# Patient Record
Sex: MALE | Race: WHITE | NOT HISPANIC OR LATINO | Employment: FULL TIME | URBAN - NONMETROPOLITAN AREA
[De-identification: names, ages, dates, MRNs, and addresses within clinical notes are randomized per-mention and may not be internally consistent; named-entity substitution may affect disease eponyms.]

---

## 2019-12-17 ENCOUNTER — OFFICE VISIT CONVERTED (OUTPATIENT)
Dept: FAMILY MEDICINE CLINIC | Age: 44
End: 2019-12-17
Attending: NURSE PRACTITIONER

## 2020-10-15 ENCOUNTER — OFFICE VISIT CONVERTED (OUTPATIENT)
Dept: FAMILY MEDICINE CLINIC | Age: 45
End: 2020-10-15
Attending: NURSE PRACTITIONER

## 2020-10-17 LAB
ALBUMIN SERPL-MCNC: 4.8 G/DL
ALBUMIN/GLOB SERPL: 2 {RATIO}
ALP SERPL-CCNC: 60 IU/L
ALT SERPL-CCNC: 27 IU/L
APPEARANCE UR: CLEAR
AST SERPL-CCNC: 26 IU/L
BASOPHILS # BLD AUTO: 0.1 X10E3/UL
BASOPHILS NFR BLD AUTO: 1 %
BILIRUB SERPL-MCNC: 0.6 MG/DL
BILIRUB SERPL-MCNC: NEGATIVE MG/DL
BUN SERPL-MCNC: 13 MG/DL
BUN/CREAT SERPL: 11
CALCIUM SERPL-MCNC: 9.5 MG/DL
CHLORIDE SERPL-SCNC: 104 MMOL/L
CHOLEST SERPL-MCNC: 134 MG/DL
CO2 SERPL-SCNC: 25 MMOL/L
COLOR UR: YELLOW
CONV BACTERIA IN URINE MICRO: ABNORMAL
CONV IMMATURE GRAN: 0 %
CONV TOTAL PROTEIN: 7.2 G/DL
CONV UROBILINOGEN IN URINE BY AUTOMATED TEST STRIP: 0.2 MG/DL
CREAT UR-MCNC: 1.19 MG/DL
CRYSTALS FLD MICRO: PRESENT
CRYSTALS URNS MICRO: ABNORMAL
EOSINOPHIL # BLD AUTO: 0.1 X10E3/UL
EOSINOPHIL # BLD AUTO: 1 %
ERYTHROCYTE [DISTWIDTH] IN BLOOD BY AUTOMATED COUNT: 12.6 %
GLOBULIN UR ELPH-MCNC: 2.4 G/DL
GLUCOSE SERPL-MCNC: 99 MG/DL
GLUCOSE SERPL-MCNC: NEGATIVE MG/DL
HCT VFR BLD AUTO: 44.7 %
HDLC SERPL-MCNC: 34 MG/DL
HEMOCCULT STL QL IA: NEGATIVE
HGB BLD-MCNC: 15.2 G/DL
IMM GRANULOCYTES # BLD: 0 X10E3/UL
KETONES UR QL STRIP: NEGATIVE
LDLC SERPL CALC-MCNC: 84 MG/DL
LEUKOCYTE ESTERASE UR QL STRIP: NEGATIVE
LYMPHOCYTES # BLD AUTO: 3 X10E3/UL
LYMPHOCYTES NFR BLD AUTO: 41 %
MAGNESIUM SERPL-MCNC: 2.2 MG/DL
MCH RBC QN AUTO: 30.6 PG
MCHC RBC AUTO-ENTMCNC: 34 G/DL
MCV RBC AUTO: 90 FL
MICRO URNS: NORMAL
MONOCYTES # BLD AUTO: 0.6 X10E3/UL
MONOCYTES NFR BLD AUTO: 8 %
MUCOUS THREADS URNS QL MICRO: PRESENT
NEUTROPHILS # BLD AUTO: 3.7 X10E3/UL
NEUTROPHILS NFR BLD AUTO: 49 %
NITRITE UR QL STRIP: NEGATIVE
PH FLD: 6 [PH]
PLATELET # BLD AUTO: 286 X10E3/UL
POTASSIUM SERPL-SCNC: 4.5 MMOL/L
PROT FLD-MCNC: NEGATIVE G/DL
RBC # BLD AUTO: 4.97 X10E6/UL
RBC # BLD AUTO: ABNORMAL /HPF
SODIUM SERPL-SCNC: 140 MMOL/L
SP GR UR: 1.02
SQUAMOUS SPT QL MICRO: ABNORMAL /HPF
TESTOST FREE SERPL-MCNC: 14.7 PG/ML
TESTOST SERPL-MCNC: 293 NG/DL
TRIGL SERPL-MCNC: 80 MG/DL
TSH SERPL-ACNC: 1.01 UIU/ML
VLDLC SERPL-MCNC: 16 MG/DL
WBC # BLD AUTO: 7.4 X10E3/UL
WBC # BLD AUTO: ABNORMAL /HPF

## 2021-05-18 NOTE — PROGRESS NOTES
Efrain Huerta  1975     Office/Outpatient Visit    Visit Date: Thu, Oct 15, 2020 08:56 am    Provider: Minor Piper N.P. (Assistant: Chanell Prater LPN)    Location: [facility]        Electronically signed by Minor Piper N.P. on  10/15/2020 10:08:20 AM                             Subjective:        CC: Mr. Huerta is a 45 year old White male.  physical exam         HPI:           Patient to be evaluated for encounter for general adult medical examination without abnormal findings.  His last physical exam was last year.  A hearing test was done results were normal.   He's had vision screening done 2 years ago and this was normal.  He performs testicular self-exams occasionally.  Depression screen is performed and is negative.  He is not current with his influenza immunization.      Smoking Status:  Nonsmoker           PHQ-9 Depression Screening: Completed form scanned and in chart; Total Score 3     ROS:     CONSTITUTIONAL:  Negative for chills, fatigue, fever, and weight change.      E/N/T:  Negative for diminished hearing and nasal congestion.      CARDIOVASCULAR:  Positive for BP mild elevation, been watching diet, down about 20 lbs , going to Gym 3-4 x week, discussed Low Na diet, less than 2gm day.      RESPIRATORY:  Negative for recent cough, dyspnea and frequent wheezing.      GASTROINTESTINAL:  Negative for abdominal pain, constipation, diarrhea, nausea and vomiting.      GENITOURINARY:  Positive for trouble keeping erection , has no trouble getting erection.   Negative for dysuria, hematuria or nocturia.      INTEGUMENTARY:  Negative for atypical moles, dry skin, pruritis, and rashes.      NEUROLOGICAL:  Positive for dizziness ( with positional changes; mainly with going from sitting to standing position ).   Negative for memory loss, paresthesias, tremor or weakness.      PSYCHIATRIC:  Positive for (no trouble falling asleep but trouble if gets up at all,  has trouble going back to sleep).   Negative for anxiety, depression or suicidal thoughts.          Past Medical History / Family History / Social History:         Last Reviewed on 10/15/2020 09:22 AM by Minor Piper    Past Medical History:             PAST MEDICAL HISTORY     UNREMARKABLE     Hospitalizations: peritonitis, admitted once on 11-25-19         Surgical History:         Tonsillectomy/Adenoidectomy     Arthroscopy: R KNEE;;; at age 16;     Right knee scope 11/ 2015;     Appendectomy: 11-24-19, ruptured appendix , was in hospital 4 days;         Family History:     Father: Hypertension;  Osteoarthritis     Mother: Heart issue     Sister(s): 1 sister alive, back problems sister(s) total         Social History:     Occupation: [employer]     Marital Status:      Children: 8 adopted children         Tobacco/Alcohol/Supplements:     Last Reviewed on 10/15/2020 09:22 AM by Minor Piper    Tobacco:  Nonsmoker (never smoked);         Substance Abuse History:     Last Reviewed on 10/15/2020 09:22 AM by Minor Piper        Mental Health History:     Last Reviewed on 10/15/2020 09:22 AM by Minor Piper        Communicable Diseases (eg STDs):     Last Reviewed on 10/15/2020 09:22 AM by Minor Piper        Current Problems:     Last Reviewed on 10/15/2020 09:22 AM by Minor Piper    Encounter for screening for depression    Encounter for general adult medical examination without abnormal findings        Immunizations:     influenza, injectable, quadrivalent, preservative free 11/1/2019    Fluvirin (4 + years dose) 10/10/2014    Fluzone (3 + years dose) 12/27/2010        Allergies:     Last Reviewed on 10/15/2020 09:22 AM by Minor Piper    No Known Allergies.        Current Medications:     Last Reviewed on 10/15/2020 09:22 AM by Minor Piper    None        Objective:        Vitals:         Current: 10/15/2020 8:58:58 AM    Ht:  6 ft, 2 in;  Wt: 265.4  lbs;  BMI: 34.1T: 97.2 F (oral);  BP: 129/87 mm Hg (left arm, sitting);  P: 76 bpm (left arm (BP Cuff), sitting);  sCr: 1.17 mg/dL;  GFR: 94.44        Repeat:     9:37:58 AM  BP:   139/92mm Hg (left arm, lying, Pulse 75) 9:39:48 AM  BP:   125/98mm Hg (left arm, sitting, Pulse 82) 9:41:35 AM  BP:   138/100mm Hg (left arm, standing, Pulse 88)     Exams:     PHYSICAL EXAM:     GENERAL: Vitals recorded well developed, well nourished;  well groomed;  no apparent distress;     E/N/T:  normal EACs, TMs, nasal/oral mucosa, teeth, gingiva, and oropharynx;     NECK: range of motion is normal; thyroid is non-palpable; carotid exam is normal with good upstroke and no bruits;     RESPIRATORY: normal respiratory rate and pattern with no distress; normal breath sounds with no rales, rhonchi, wheezes or rubs;     CARDIOVASCULAR: normal rate; rhythm is regular;  no systolic murmur; no edema;     GASTROINTESTINAL: nontender, nondistended; no hepatosplenomegaly or masses; no bruits;     MUSCULOSKELETAL:  Normal range of motion, strength and tone;     NEUROLOGIC: GROSSLY INTACT     PSYCHIATRIC:  appropriate affect and demeanor; normal speech pattern; grossly normal memory;         Lab/Test Results:         LABORATORY RESULTS: EKG performed by bb         Assessment:         Z00.00   Encounter for general adult medical examination without abnormal findings       Z13.31   Encounter for screening for depression       N52.8   Other male erectile dysfunction       H81.4   Vertigo of central origin       G47.00   Insomnia, unspecified           ORDERS:         Meds Prescribed:       [New Rx] traZODone 50 mg oral tablet [take 1-2 tablets daily about 1 hour before bedtime], #60 (sixty) tablets, Refills: 0 (zero)         Radiology/Test Orders:       54731  Electrocardiogram, routine with at least 12 leads; with interpretation and report  (In-House)              Lab Orders:       31623  [facility] PHYSICAL: CMP, CBC, TSH, LIPID: [id number], [id number],  94217, 58215  (Send-Out)            06262  BDUAM - [facility] Urinalysis, automated, with micro  (Send-Out)            35100  TFTSG - [facility] Testosterone, free and Total weakly bound  (Send-Out)            15306  MG - [facility] Magnesium, Serum  (Send-Out)            ORTHO  Orthostatic blood pressure  (In-House)              Other Orders:         Depression screen negative  (In-House)            1101F  Pt screen for fall risk; document no falls in past year or only 1 fall w/o injury in past year (OSBALDO)  (In-House)                      Plan:         Encounter for general adult medical examination without abnormal findingsGets flu shot at work for free. dmt    LABORATORY:  Labs ordered to be performed today include PHYSICAL PANEL; CMP, CBC, TSH, LIPID and urinalysis with micro.  MIPS Has had no falls or only one fall without injury in the past year Vaccines Flu and Pneumonia updated in Shot record           Orders:       85332  PHYSF - [facility] PHYSICAL: CMP, CBC, TSH, LIPID: [id number], 76138, 39191, 56695  (Send-Out)            06215  BDUAM - [facility] Urinalysis, automated, with micro  (Send-Out)            1101F  Pt screen for fall risk; document no falls in past year or only 1 fall w/o injury in past year (OSBALDO)  (In-House)              Encounter for screening for depression    MIPS PHQ-9 Depression Screening: Completed form scanned and in chart; Total Score 3; Negative Depression Screen           Orders:         Depression screen negative  (In-House)              Other male erectile dysfunction    LABORATORY:  Labs ordered to be performed today include Testosterone free and total.            Orders:       97000  TFTSG - [facility] Testosterone, free and Total weakly bound  (Send-Out)              Vertigo of central originWill repeat Bps at home bid x 2 weeks , will call and see what readings look like, may need low dose BP med.     LABORATORY:  Labs ordered to be performed today include Magnesium level.      TESTS/PROCEDURES:  Will proceed  with an ECG and Orthostatic Blood pressures to be performed/scheduled now.            Orders:       53143  MG - [facility] Magnesium, Serum  (Send-Out)            96447  Electrocardiogram, routine with at least 12 leads; with interpretation and report  (In-House)            ORTHO  Orthostatic blood pressure  (In-House)              Insomnia, unspecified          Prescriptions:       [New Rx] traZODone 50 mg oral tablet [take 1-2 tablets daily about 1 hour before bedtime], #60 (sixty) tablets, Refills: 0 (zero)             Charge Capture:         Primary Diagnosis:     Z00.00  Encounter for general adult medical examination without abnormal findings           Orders:      94967  Preventive medicine, established patient, age 40-64 years  (In-House)            1101F  Pt screen for fall risk; document no falls in past year or only 1 fall w/o injury in past year (OSBALDO)  (In-House)              Z13.31  Encounter for screening for depression           Orders:      95439-48  Office/outpatient visit; established patient, level 3  (In-House)              Depression screen negative  (In-House)              N52.8  Other male erectile dysfunction     H81.4  Vertigo of central origin           Orders:      74109  Electrocardiogram, routine with at least 12 leads; with interpretation and report  (In-House)            ORTHO  Orthostatic blood pressure  (In-House)              G47.00  Insomnia, unspecified         ADDENDUMS:      ____________________________________    Addendum: 10/21/2020 11:09 AM - Minor Piper            ADDENDUM: Add 37225; Remove 65571 dmt

## 2021-05-18 NOTE — PROGRESS NOTES
Efrain Huerta  1975     Office/Outpatient Visit    Visit Date: Tue, Dec 17, 2019 03:43 pm    Provider: Minor Piper N.P. (Assistant: Georgia Bach RN)    Location: [facility]        Electronically signed by Minor Piper N.P. on  12/17/2019 04:24:20 PM                             Subjective:        CC: Mr. Huerta is a 44 year old White male.  This is his first visit to the clinic.  Establish New PCP         HPI:           PHQ-9 Depression Screening: Completed form scanned and in chart; Total Score 3       Here for follow up after acute appendicitis with rupture and surgical removal. Was in [place] 11/24/19 to 11/29/19 , treated by Dr Alonso , since discharge has been feeling fine, no fever, bowels movements wnl, drinking and eating without problems.    ROS:     CONSTITUTIONAL:  Negative for chills, fatigue, fever and weight change.      CARDIOVASCULAR:  Negative for chest pain, orthopnea, paroxysmal nocturnal dyspnea and pedal edema.      RESPIRATORY:  Negative for dyspnea and cough.      GASTROINTESTINAL:  Negative for abdominal pain, heartburn, constipation, diarrhea, and stool changes.      PSYCHIATRIC:  Negative for anxiety and depression.          Past Medical History / Family History / Social History:         Last Reviewed on 12/17/2019 04:05 PM by Minor Piper    Past Medical History:             PAST MEDICAL HISTORY     UNREMARKABLE     Hospitalizations: peritonitis, admitted once on 11-25-19         Surgical History:         Tonsillectomy/Adenoidectomy     Arthroscopy: R KNEE;;; at age 16;     Right knee scope 11/ 2015;     Appendectomy: 11-24-19, ruptured appendix , was in hospital 4 days;         Family History:     Father: Hypertension;  Osteoarthritis     Mother: Heart issue     Sister(s): 1 sister alive, back problems sister(s) total         Social History:     Occupation: [employer] , in [employer]     Marital Status:      Children: 8 adopted  children         Tobacco/Alcohol/Supplements:     Last Reviewed on 12/17/2019 04:05 PM by Minor Piper    Tobacco:  Nonsmoker (never smoked);         Substance Abuse History:     Last Reviewed on 12/17/2019 04:05 PM by Minor Piper        Mental Health History:     Last Reviewed on 12/17/2019 04:05 PM by Minor Piper        Communicable Diseases (eg STDs):     Last Reviewed on 12/17/2019 04:05 PM by Minor Piper        Current Problems:     Last Reviewed on 12/17/2019 04:05 PM by Minor Piper    Encounter for screening for depression        Immunizations:     Fluvirin (4 + years dose) 10/10/2014    Fluzone (3 + years dose) 12/27/2010    influenza, injectable, quadrivalent, preservative free 11/1/2019        Allergies:     Last Reviewed on 12/17/2019 04:05 PM by Minor Piper    No Known Allergies.        Current Medications:     Last Reviewed on 12/17/2019 04:05 PM by Minor Piper    None        Objective:        Vitals:         Current: 12/17/2019 3:51:01 PM    Ht:  6 ft, 2 in;  Wt: 275 lbs;  BMI: 35.3T: 97.9 F (oral);  BP: 135/84 mm Hg (left arm, sitting);  P: 96 bpm (left arm (BP Cuff), sitting);  sCr: 1.17 mg/dL;  GFR: 96.86        Exams:     PHYSICAL EXAM:     GENERAL: Vitals recorded well developed, well nourished;  well groomed;  no apparent distress;     RESPIRATORY: normal respiratory rate and pattern with no distress; normal breath sounds with no rales, rhonchi, wheezes or rubs;     CARDIOVASCULAR: normal rate; rhythm is regular;  normal S1; normal S2; no systolic murmur; no cyanosis; no edema;     GASTROINTESTINAL: nontender, nondistended; no hepatosplenomegaly or masses; no bruits;     SKIN: Lap incisions abd healing well no s/s infection;     NEUROLOGIC: GROSSLY INTACT     PSYCHIATRIC:  appropriate affect and demeanor; normal speech pattern; grossly normal memory;         Assessment:         Z13.31   Encounter for screening for depression       K35   Acute  appendicitis           ORDERS:         Other Orders:         Depression screen negative  (In-House)                      Plan:         Encounter for screening for depression    MIPS PHQ-9 Depression Screening: Completed form scanned and in chart; Total Score 3; Negative Depression Screen           Orders:         Depression screen negative  (In-House)              Acute appendicitisNo changes needed today, will get recent labs from [place] for our records, can return to clinic prn and for yearly PE . dmt            Charge Capture:         Primary Diagnosis:     Z13.31  Encounter for screening for depression           Orders:      68878  Office visit - new pt, level 2  (In-House)              Depression screen negative  (In-House)              K35  Acute appendicitis

## 2021-07-01 VITALS
DIASTOLIC BLOOD PRESSURE: 84 MMHG | HEART RATE: 96 BPM | TEMPERATURE: 97.9 F | HEIGHT: 74 IN | WEIGHT: 275 LBS | BODY MASS INDEX: 35.29 KG/M2 | SYSTOLIC BLOOD PRESSURE: 135 MMHG

## 2021-07-02 VITALS
SYSTOLIC BLOOD PRESSURE: 138 MMHG | BODY MASS INDEX: 34.06 KG/M2 | HEIGHT: 74 IN | WEIGHT: 265.4 LBS | DIASTOLIC BLOOD PRESSURE: 100 MMHG | HEART RATE: 76 BPM | TEMPERATURE: 97.2 F

## 2021-10-26 ENCOUNTER — OFFICE VISIT (OUTPATIENT)
Dept: FAMILY MEDICINE CLINIC | Age: 46
End: 2021-10-26

## 2021-10-26 ENCOUNTER — LAB (OUTPATIENT)
Dept: LAB | Facility: HOSPITAL | Age: 46
End: 2021-10-26

## 2021-10-26 VITALS
DIASTOLIC BLOOD PRESSURE: 82 MMHG | BODY MASS INDEX: 36.46 KG/M2 | HEART RATE: 87 BPM | WEIGHT: 284 LBS | SYSTOLIC BLOOD PRESSURE: 128 MMHG

## 2021-10-26 DIAGNOSIS — Z13.6 SCREENING FOR CARDIOVASCULAR CONDITION: ICD-10-CM

## 2021-10-26 DIAGNOSIS — Z11.59 SCREENING FOR VIRAL DISEASE: ICD-10-CM

## 2021-10-26 DIAGNOSIS — Z23 ENCOUNTER FOR IMMUNIZATION: ICD-10-CM

## 2021-10-26 DIAGNOSIS — R05.9 COUGH: ICD-10-CM

## 2021-10-26 DIAGNOSIS — Z00.00 ROUTINE GENERAL MEDICAL EXAMINATION AT A HEALTH CARE FACILITY: Primary | ICD-10-CM

## 2021-10-26 LAB
ALBUMIN SERPL-MCNC: 4.7 G/DL (ref 3.5–5.2)
ALBUMIN/GLOB SERPL: 1.7 G/DL
ALP SERPL-CCNC: 63 U/L (ref 39–117)
ALT SERPL W P-5'-P-CCNC: 34 U/L (ref 1–41)
ANION GAP SERPL CALCULATED.3IONS-SCNC: 8.7 MMOL/L (ref 5–15)
AST SERPL-CCNC: 25 U/L (ref 1–40)
BILIRUB SERPL-MCNC: 0.4 MG/DL (ref 0–1.2)
BUN SERPL-MCNC: 15 MG/DL (ref 6–20)
BUN/CREAT SERPL: 12.8 (ref 7–25)
CALCIUM SPEC-SCNC: 9.4 MG/DL (ref 8.6–10.5)
CHLORIDE SERPL-SCNC: 102 MMOL/L (ref 98–107)
CHOLEST SERPL-MCNC: 177 MG/DL (ref 0–200)
CO2 SERPL-SCNC: 28.3 MMOL/L (ref 22–29)
CREAT SERPL-MCNC: 1.17 MG/DL (ref 0.76–1.27)
GFR SERPL CREATININE-BSD FRML MDRD: 67 ML/MIN/1.73
GLOBULIN UR ELPH-MCNC: 2.7 GM/DL
GLUCOSE SERPL-MCNC: 100 MG/DL (ref 65–99)
HCV AB SER DONR QL: NORMAL
HDLC SERPL-MCNC: 35 MG/DL (ref 40–60)
LDLC SERPL CALC-MCNC: 119 MG/DL (ref 0–100)
LDLC/HDLC SERPL: 3.33 {RATIO}
POTASSIUM SERPL-SCNC: 4.5 MMOL/L (ref 3.5–5.2)
PROT SERPL-MCNC: 7.4 G/DL (ref 6–8.5)
SODIUM SERPL-SCNC: 139 MMOL/L (ref 136–145)
TRIGL SERPL-MCNC: 127 MG/DL (ref 0–150)
VLDLC SERPL-MCNC: 23 MG/DL (ref 5–40)

## 2021-10-26 PROCEDURE — 86803 HEPATITIS C AB TEST: CPT

## 2021-10-26 PROCEDURE — 99396 PREV VISIT EST AGE 40-64: CPT | Performed by: NURSE PRACTITIONER

## 2021-10-26 PROCEDURE — 80053 COMPREHEN METABOLIC PANEL: CPT

## 2021-10-26 PROCEDURE — 80061 LIPID PANEL: CPT

## 2021-10-26 PROCEDURE — 90686 IIV4 VACC NO PRSV 0.5 ML IM: CPT | Performed by: NURSE PRACTITIONER

## 2021-10-26 PROCEDURE — 36415 COLL VENOUS BLD VENIPUNCTURE: CPT

## 2021-10-26 PROCEDURE — 90471 IMMUNIZATION ADMIN: CPT | Performed by: NURSE PRACTITIONER

## 2021-10-26 NOTE — PROGRESS NOTES
Chief Complaint  Efrain Stokes presents to [facility] for Annual Exam    Subjective          Efrain is here today for annual exam.  Last annual exam was 1 year ago.     Immunization status:  Due for TDAp  Last eye exam:   years  Last dental exam:   Been a couple years   Smoking status:   Social History    Tobacco Use      Smoking status: Never Smoker      Smokeless tobacco: Never Used     Alcohol use:  Social History    Substance and Sexual Activity      Alcohol use: Yes        Comment: Moderate  weekly      Recreational drug use:   Social History    Substance and Sexual Activity      Drug use: Never     Diet / exercise:   Gym 2 times weekly            Review of Systems   Constitutional: Negative for fatigue, unexpected weight gain and unexpected weight loss.   HENT: Negative for congestion, postnasal drip and sneezing.    Eyes: Positive for visual disturbance (changed).   Respiratory: Positive for cough (been going on for about 2 weeks  ;  originally only when lay down;  now all day but not at night). Negative for shortness of breath.    Cardiovascular: Negative for chest pain.   Gastrointestinal: Negative for abdominal pain, GERD and indigestion.   Genitourinary: Negative for nocturia.   Musculoskeletal: Negative for arthralgias and joint swelling.   Skin: Negative for skin lesions.   Allergic/Immunologic: Positive for environmental allergies (seasonal).   Neurological: Negative for dizziness and headache.   Psychiatric/Behavioral: Negative for sleep disturbance, depressed mood and stress. The patient is not nervous/anxious.          No Known Allergies   No past medical history on file.  No current outpatient medications on file.     No current facility-administered medications for this visit.     Past Surgical History:   Procedure Laterality Date   • APPENDECTOMY  2019   • KNEE MENISCAL REPAIR Right       Social History     Tobacco Use   • Smoking status: Never Smoker   •  Smokeless tobacco: Never Used   Vaping Use   • Vaping Use: Never used   Substance Use Topics   • Alcohol use: Yes     Comment: Moderate  weekly   • Drug use: Never     Family History   Problem Relation Age of Onset   • Hypotension Father      Health Maintenance Due   Topic Date Due   • TDAP/TD VACCINES (1 - Tdap) Never done      Immunization History   Administered Date(s) Administered   • COVID-19 (PFIZER) 04/02/2021, 04/22/2021   • Flu Vaccine Split Quad 10/21/2020   • FluLaval/Fluarix/Fluzone >6 10/26/2021        Objective     Vitals:    10/26/21 0816 10/26/21 0911   BP: 135/88 128/82   BP Location: Right arm Right arm   Patient Position: Sitting Sitting   Cuff Size: Adult Adult   Pulse: 87    Weight: 129 kg (284 lb)      Body mass index is 36.46 kg/m².     Physical Exam  Vitals reviewed.   Constitutional:       Appearance: Normal appearance.   HENT:      Head: Normocephalic.      Mouth/Throat:      Mouth: Mucous membranes are moist.      Pharynx: Oropharynx is clear.   Eyes:      Conjunctiva/sclera: Conjunctivae normal.   Cardiovascular:      Rate and Rhythm: Normal rate and regular rhythm.      Heart sounds: No murmur heard.      Pulmonary:      Effort: Pulmonary effort is normal.      Breath sounds: Normal breath sounds.   Abdominal:      General: Bowel sounds are normal.      Tenderness: There is no abdominal tenderness.   Musculoskeletal:         General: Normal range of motion.      Cervical back: Normal range of motion.   Skin:     General: Skin is warm and dry.   Neurological:      General: No focal deficit present.      Mental Status: He is alert. Mental status is at baseline.   Psychiatric:         Mood and Affect: Mood normal.         Behavior: Behavior normal.         Thought Content: Thought content normal.           Result Review :                               Assessment and Plan      Diagnoses and all orders for this visit:    1. Routine general medical examination at a health care facility  (Primary)  Comments:  diet and exercise, follow up annually for wellness, discussed colonoscopy - declines until 50    2. Screening for viral disease  -     Hepatitis C antibody; Future    3. Encounter for immunization  -     FluLaval/Fluarix/Fluzone >6 Months ([id number])    4. Screening for cardiovascular condition  -     Lipid panel; Future  -     Comprehensive metabolic panel; Future    5. Cough  Comments:  recommend trial of antihistamine or PPI  - Follow up if no improvement              Follow Up     Return in about 1 year (around 10/26/2022), or if symptoms worsen or fail to improve - cough, for Annual physical.

## 2021-10-29 ENCOUNTER — TELEPHONE (OUTPATIENT)
Dept: FAMILY MEDICINE CLINIC | Age: 46
End: 2021-10-29

## 2021-10-29 NOTE — TELEPHONE ENCOUNTER
Caller: Efrain Stokes    Relationship: Self    Best call back number: [phone]     Caller requesting test results: SELF    What test was performed: BLOOD WORK    When was the test performed: 10/26/21    Where was the test performed: [facility]

## 2022-11-01 ENCOUNTER — LAB (OUTPATIENT)
Dept: LAB | Facility: HOSPITAL | Age: 47
End: 2022-11-01

## 2022-11-01 ENCOUNTER — OFFICE VISIT (OUTPATIENT)
Dept: FAMILY MEDICINE CLINIC | Age: 47
End: 2022-11-01

## 2022-11-01 VITALS
TEMPERATURE: 98.2 F | SYSTOLIC BLOOD PRESSURE: 130 MMHG | OXYGEN SATURATION: 97 % | DIASTOLIC BLOOD PRESSURE: 82 MMHG | WEIGHT: 275.6 LBS | HEIGHT: 74 IN | HEART RATE: 100 BPM | BODY MASS INDEX: 35.37 KG/M2

## 2022-11-01 DIAGNOSIS — Z13.6 SCREENING FOR CARDIOVASCULAR CONDITION: ICD-10-CM

## 2022-11-01 DIAGNOSIS — Z00.00 ROUTINE GENERAL MEDICAL EXAMINATION AT A HEALTH CARE FACILITY: Primary | ICD-10-CM

## 2022-11-01 DIAGNOSIS — Z23 FLU VACCINE NEED: ICD-10-CM

## 2022-11-01 DIAGNOSIS — K64.9 HEMORRHOIDS, UNSPECIFIED HEMORRHOID TYPE: ICD-10-CM

## 2022-11-01 DIAGNOSIS — Z12.11 SCREENING FOR COLON CANCER: ICD-10-CM

## 2022-11-01 LAB
ALBUMIN SERPL-MCNC: 4.5 G/DL (ref 3.5–5.2)
ALBUMIN/GLOB SERPL: 2 G/DL
ALP SERPL-CCNC: 64 U/L (ref 39–117)
ALT SERPL W P-5'-P-CCNC: 28 U/L (ref 1–41)
ANION GAP SERPL CALCULATED.3IONS-SCNC: 8.6 MMOL/L (ref 5–15)
AST SERPL-CCNC: 31 U/L (ref 1–40)
BILIRUB SERPL-MCNC: 0.4 MG/DL (ref 0–1.2)
BUN SERPL-MCNC: 13 MG/DL (ref 6–20)
BUN/CREAT SERPL: 10.6 (ref 7–25)
CALCIUM SPEC-SCNC: 9.1 MG/DL (ref 8.6–10.5)
CHLORIDE SERPL-SCNC: 106 MMOL/L (ref 98–107)
CHOLEST SERPL-MCNC: 145 MG/DL (ref 0–200)
CO2 SERPL-SCNC: 26.4 MMOL/L (ref 22–29)
CREAT SERPL-MCNC: 1.23 MG/DL (ref 0.76–1.27)
EGFRCR SERPLBLD CKD-EPI 2021: 72.9 ML/MIN/1.73
GLOBULIN UR ELPH-MCNC: 2.3 GM/DL
GLUCOSE SERPL-MCNC: 102 MG/DL (ref 65–99)
HDLC SERPL-MCNC: 42 MG/DL (ref 40–60)
LDLC SERPL CALC-MCNC: 86 MG/DL (ref 0–100)
LDLC/HDLC SERPL: 2.03 {RATIO}
POTASSIUM SERPL-SCNC: 4.6 MMOL/L (ref 3.5–5.2)
PROT SERPL-MCNC: 6.8 G/DL (ref 6–8.5)
SODIUM SERPL-SCNC: 141 MMOL/L (ref 136–145)
TRIGL SERPL-MCNC: 89 MG/DL (ref 0–150)
VLDLC SERPL-MCNC: 17 MG/DL (ref 5–40)

## 2022-11-01 PROCEDURE — 36415 COLL VENOUS BLD VENIPUNCTURE: CPT

## 2022-11-01 PROCEDURE — 99396 PREV VISIT EST AGE 40-64: CPT | Performed by: NURSE PRACTITIONER

## 2022-11-01 PROCEDURE — 80053 COMPREHEN METABOLIC PANEL: CPT

## 2022-11-01 PROCEDURE — 90686 IIV4 VACC NO PRSV 0.5 ML IM: CPT | Performed by: NURSE PRACTITIONER

## 2022-11-01 PROCEDURE — 80061 LIPID PANEL: CPT

## 2022-11-01 PROCEDURE — 90471 IMMUNIZATION ADMIN: CPT | Performed by: NURSE PRACTITIONER

## 2022-11-01 NOTE — PROGRESS NOTES
Assessment and Plan   Diagnoses and all orders for this visit:    1. Routine general medical examination at a health care facility (Primary)  Comments:  diet and exercise, annual wellness, health maintenance recommendations discussed     2. Hemorrhoids, unspecified hemorrhoid type  Comments:  OTC and discuss with general surgery when colon cancer screening     3. Flu vaccine need  -     FluLaval/Fluzone >6 mos ([id number])    4. Screening for cardiovascular condition  -     Lipid panel; Future  -     Comprehensive metabolic panel; Future    5. Screening for colon cancer  -     Ambulatory Referral For Screening Colonoscopy                  Follow Up   Return in about 1 year (around 11/1/2023) for Annual physical.    Chief Complaint  Efrain Stokes presents to [facility] for Annual Exam    Subjective          History of Present Illness  Efrain is here today for annual exam.   Last annual exam was 1 year  Last eye exam: years  PROVIDER:  n/a  Last dental exam:   years    PROVIDER:  n/a  Diet / exercise:   Gym 3-4 times per week since July  No diet     Patient's Body mass index is 35.38 kg/m². indicating that he is obese (BMI >30). Obesity-related health conditions include the following: none. Obesity is improving with lifestyle modifications. BMI is is above average; BMI management plan is completed. We discussed low calorie, low carb based diet program, portion control and increasing exercise..  Satisfied with weight?  No  Goal 260    Patient Care Team:  Jennifer Russ APRN as PCP - General (Nurse Practitioner)     The following health maintenance recommendations have been discussed and ordered as allowed per patient discussion:  COLORECTAL CANCER SCREENING Never done  Ordered today  TDAP/TD VACCINES(1 - Tdap) Never done  Request records  COVID-19 Vaccine(4 - Booster) due on 01/09/2022  declines  ANNUAL PHYSICAL due on 10/27/2022    today    The 10-year ASCVD risk score (Umu KWON,  "et al., 2019) is: 3.2%    Values used to calculate the score:      Age: 47 years      Sex: Male      Is Non- : No      Diabetic: No      Tobacco smoker: No      Systolic Blood Pressure: 130 mmHg      Is BP treated: No      HDL Cholesterol: 35 mg/dL      Total Cholesterol: 177 mg/dL    Thinks he has developed a hemorrhoid.  Getting better now but still present         Review of Systems   HENT: Negative for congestion and sinus pressure.    Eyes: Negative for blurred vision and visual disturbance.   Respiratory: Negative for cough and shortness of breath.    Cardiovascular: Negative for chest pain and leg swelling.   Gastrointestinal: Positive for indigestion. Negative for blood in stool and constipation.        Hemorrhoid developed   Genitourinary: Negative for frequency and nocturia.   Musculoskeletal: Negative for arthralgias and back pain.   Skin: Negative for rash and skin lesions.   Allergic/Immunologic: Negative for environmental allergies.   Neurological: Negative for dizziness and numbness.   Psychiatric/Behavioral: Negative for sleep disturbance, depressed mood and stress. The patient is not nervous/anxious.        Objective     Vitals:    11/01/22 0814   BP: 130/82   BP Location: Right arm   Patient Position: Sitting   Cuff Size: Large Adult   Pulse: 100   Temp: 98.2 °F (36.8 °C)   TempSrc: Oral   SpO2: 97%   Weight: 125 kg (275 lb 9.6 oz)   Height: 188 cm (74\")     Body mass index is 35.38 kg/m².     Physical Exam  Vitals reviewed.   Constitutional:       Appearance: Normal appearance.   HENT:      Head: Normocephalic.      Mouth/Throat:      Mouth: Mucous membranes are moist.      Pharynx: Oropharynx is clear.   Eyes:      Conjunctiva/sclera: Conjunctivae normal.   Cardiovascular:      Rate and Rhythm: Normal rate and regular rhythm.      Heart sounds: No murmur heard.  Pulmonary:      Effort: Pulmonary effort is normal.      Breath sounds: Normal breath sounds.   Abdominal:      " General: Bowel sounds are normal.      Tenderness: There is no abdominal tenderness.   Musculoskeletal:         General: Normal range of motion.      Cervical back: Normal range of motion.   Skin:     General: Skin is warm and dry.   Neurological:      General: No focal deficit present.      Mental Status: He is alert. Mental status is at baseline.   Psychiatric:         Mood and Affect: Mood normal.         Behavior: Behavior normal.         Thought Content: Thought content normal.         Result Review                        No Known Allergies   History reviewed. No pertinent past medical history.  No current outpatient medications on file.     No current facility-administered medications for this visit.     Past Surgical History:   Procedure Laterality Date   • APPENDECTOMY  2019   • KNEE MENISCAL REPAIR Right       Health Maintenance Due   Topic Date Due   • COLORECTAL CANCER SCREENING  Never done   • TDAP/TD VACCINES (1 - Tdap) Never done   • COVID-19 Vaccine (4 - Booster) 01/09/2022      Immunization History   Administered Date(s) Administered   • COVID-19 (PFIZER) PURPLE CAP 04/02/2021, 04/22/2021, 11/14/2021   • Flu Vaccine Split Quad 10/21/2020   • FluLaval/Fluzone >6mos 10/26/2021, 11/01/2022   • Influenza, Unspecified 10/21/2020, 10/26/2021

## 2022-11-08 PROBLEM — K64.9 HEMORRHOIDS: Status: ACTIVE | Noted: 2022-11-08

## 2023-11-06 ENCOUNTER — LAB (OUTPATIENT)
Dept: LAB | Facility: HOSPITAL | Age: 48
End: 2023-11-06
Payer: COMMERCIAL

## 2023-11-06 ENCOUNTER — OFFICE VISIT (OUTPATIENT)
Dept: FAMILY MEDICINE CLINIC | Age: 48
End: 2023-11-06
Payer: COMMERCIAL

## 2023-11-06 VITALS
WEIGHT: 281.2 LBS | TEMPERATURE: 98.2 F | HEIGHT: 74 IN | BODY MASS INDEX: 36.09 KG/M2 | DIASTOLIC BLOOD PRESSURE: 86 MMHG | SYSTOLIC BLOOD PRESSURE: 138 MMHG | OXYGEN SATURATION: 98 % | HEART RATE: 97 BPM

## 2023-11-06 DIAGNOSIS — R73.09 ELEVATED GLUCOSE: ICD-10-CM

## 2023-11-06 DIAGNOSIS — Z13.6 SCREENING FOR CARDIOVASCULAR CONDITION: ICD-10-CM

## 2023-11-06 DIAGNOSIS — Z23 NEED FOR VACCINATION: ICD-10-CM

## 2023-11-06 DIAGNOSIS — N52.9 ERECTILE DYSFUNCTION, UNSPECIFIED ERECTILE DYSFUNCTION TYPE: ICD-10-CM

## 2023-11-06 DIAGNOSIS — E66.01 CLASS 2 SEVERE OBESITY DUE TO EXCESS CALORIES WITH SERIOUS COMORBIDITY AND BODY MASS INDEX (BMI) OF 36.0 TO 36.9 IN ADULT: ICD-10-CM

## 2023-11-06 DIAGNOSIS — Z00.00 ROUTINE GENERAL MEDICAL EXAMINATION AT A HEALTH CARE FACILITY: Primary | ICD-10-CM

## 2023-11-06 DIAGNOSIS — I10 PRIMARY HYPERTENSION: ICD-10-CM

## 2023-11-06 LAB
ALBUMIN SERPL-MCNC: 4.8 G/DL (ref 3.5–5.2)
ALBUMIN/GLOB SERPL: 1.7 G/DL
ALP SERPL-CCNC: 68 U/L (ref 39–117)
ALT SERPL W P-5'-P-CCNC: 97 U/L (ref 1–41)
ANION GAP SERPL CALCULATED.3IONS-SCNC: 9.3 MMOL/L (ref 5–15)
AST SERPL-CCNC: 64 U/L (ref 1–40)
BILIRUB SERPL-MCNC: 0.6 MG/DL (ref 0–1.2)
BUN SERPL-MCNC: 18 MG/DL (ref 6–20)
BUN/CREAT SERPL: 14.4 (ref 7–25)
CALCIUM SPEC-SCNC: 9.6 MG/DL (ref 8.6–10.5)
CHLORIDE SERPL-SCNC: 103 MMOL/L (ref 98–107)
CHOLEST SERPL-MCNC: 159 MG/DL (ref 0–200)
CO2 SERPL-SCNC: 25.7 MMOL/L (ref 22–29)
CREAT SERPL-MCNC: 1.25 MG/DL (ref 0.76–1.27)
EGFRCR SERPLBLD CKD-EPI 2021: 71 ML/MIN/1.73
GLOBULIN UR ELPH-MCNC: 2.8 GM/DL
GLUCOSE SERPL-MCNC: 102 MG/DL (ref 65–99)
HBA1C MFR BLD: 6.1 % (ref 4.8–5.6)
HDLC SERPL-MCNC: 37 MG/DL (ref 40–60)
LDLC SERPL CALC-MCNC: 104 MG/DL (ref 0–100)
LDLC/HDLC SERPL: 2.77 {RATIO}
POTASSIUM SERPL-SCNC: 4.2 MMOL/L (ref 3.5–5.2)
PROT SERPL-MCNC: 7.6 G/DL (ref 6–8.5)
SODIUM SERPL-SCNC: 138 MMOL/L (ref 136–145)
TRIGL SERPL-MCNC: 97 MG/DL (ref 0–150)
VLDLC SERPL-MCNC: 18 MG/DL (ref 5–40)

## 2023-11-06 PROCEDURE — 80061 LIPID PANEL: CPT

## 2023-11-06 PROCEDURE — 36415 COLL VENOUS BLD VENIPUNCTURE: CPT

## 2023-11-06 PROCEDURE — 83036 HEMOGLOBIN GLYCOSYLATED A1C: CPT

## 2023-11-06 PROCEDURE — 80053 COMPREHEN METABOLIC PANEL: CPT

## 2023-11-06 RX ORDER — SILDENAFIL 25 MG/1
25-50 TABLET, FILM COATED ORAL DAILY PRN
Qty: 30 TABLET | Refills: 1 | Status: SHIPPED | OUTPATIENT
Start: 2023-11-06

## 2023-11-06 NOTE — PROGRESS NOTES
Chief Complaint  Efrain Stokes presents to [facility] for Annual Exam and Erectile Dysfunction      Subjective     History of Present Illness  Efrain is here today for annual exam.   Last annual exam was  1 year(s)  Last eye exam: 20+  years  PROVIDER:   n/a  Last dental exam:   1 year    PROVIDER:   [place]  dentist    Diet / exercise:   3-4 days of work out per week x 1 year, no special diet  Patient's Body mass index is 36.1 kg/m². indicating that he is obese (BMI >30)    Patient Care Team:  Jennifer Russ APRN as PCP - General (Nurse Practitioner)     He has also been having problems maintaining an erection.  This has been going on for several years.  He has tried conservative measures at home with no benefit. He is still actively trying to lose weight.  He has never been on medication before.      Assessment and Plan     -advised of a well balanced diet and exercise as tolerated  -advised of annual wellness exams are recommended  -discussed health care maintenance and gaps in care and orders have been placed as necessary and as willing by the patient.     Diagnoses and all orders for this visit:    1. Routine general medical examination at a health care facility (Primary)    2. Need for vaccination  -     Fluzone >6 Months ([id number])    3. Screening for cardiovascular condition  -     Comprehensive metabolic panel; Future  -     Lipid panel; Future    4. Primary hypertension    5. Elevated glucose  -     Hemoglobin A1c; Future    6. Erectile dysfunction, unspecified erectile dysfunction type  -     sildenafil (VIAGRA) 25 MG tablet; Take 1-2 tablets by mouth Daily As Needed for Erectile Dysfunction. Take 30 min prior to sexual intercourse  Dispense: 30 tablet; Refill: 1    7. Class 2 severe obesity due to excess calories with serious comorbidity and body mass index (BMI) of 36.0 to 36.9 in adult             Follow Up   Return in about 1 year (around 11/6/2024) for Annual  "physical.    Future Appointments   Date Time Provider Department Center   11/8/2024  8:15 AM Jennifer Russ APRN [facility]         New Medications Ordered This Visit   Medications    sildenafil (VIAGRA) 25 MG tablet     Sig: Take 1-2 tablets by mouth Daily As Needed for Erectile Dysfunction. Take 30 min prior to sexual intercourse     Dispense:  30 tablet     Refill:  1       There are no discontinued medications.      Review of Systems   Constitutional:  Negative for fatigue, unexpected weight gain and unexpected weight loss.   HENT:  Negative for congestion, hearing loss and sinus pressure.    Eyes:  Positive for visual disturbance (reading). Negative for blurred vision.   Respiratory:  Negative for cough and shortness of breath.    Cardiovascular:  Negative for chest pain and leg swelling.   Gastrointestinal:  Negative for abdominal pain, constipation, diarrhea and GERD.   Genitourinary:  Positive for frequency (normal for him), nocturia (normal for him) and erectile dysfunction. Negative for dysuria.   Musculoskeletal:  Positive for joint swelling (right knee - previous surgeries in past -generally  manageable).   Skin:  Negative for rash and skin lesions.   Allergic/Immunologic: Positive for environmental allergies.   Neurological:  Negative for dizziness and headache.   Psychiatric/Behavioral:  Negative for sleep disturbance and depressed mood. The patient is not nervous/anxious.        Objective     Vitals:    11/06/23 0803 11/06/23 0908   BP: 141/96 138/86   BP Location: Right arm Left arm   Patient Position: Sitting    Cuff Size: Adult    Pulse: 97    Temp: 98.2 °F (36.8 °C)    TempSrc: Temporal    SpO2: 98%    Weight: 128 kg (281 lb 3.2 oz)    Height: 188 cm (74\")      Body mass index is 36.1 kg/m².     Class 2 Severe Obesity (BMI >=35 and <=39.9). Obesity-related health conditions include the following: osteoarthritis. Obesity is unchanged. BMI is is above average; BMI management plan is " completed. We discussed low calorie, low carb based diet program, portion control, and increasing exercise.      Physical Exam  Vitals reviewed.   Constitutional:       Appearance: Normal appearance. He is obese.   HENT:      Head: Normocephalic.      Mouth/Throat:      Mouth: Mucous membranes are moist.      Pharynx: Oropharynx is clear.   Eyes:      Conjunctiva/sclera: Conjunctivae normal.   Cardiovascular:      Rate and Rhythm: Normal rate and regular rhythm.      Heart sounds: No murmur heard.  Pulmonary:      Effort: Pulmonary effort is normal.      Breath sounds: Normal breath sounds.   Abdominal:      General: Bowel sounds are normal.      Tenderness: There is no abdominal tenderness.   Musculoskeletal:         General: Normal range of motion.      Cervical back: Normal range of motion.   Skin:     General: Skin is warm and dry.   Neurological:      General: No focal deficit present.      Mental Status: He is alert. Mental status is at baseline.   Psychiatric:         Mood and Affect: Mood normal.         Behavior: Behavior normal.         Thought Content: Thought content normal.            Result Review                   No Known Allergies   History reviewed. No pertinent past medical history.  Current Outpatient Medications   Medication Sig Dispense Refill    sildenafil (VIAGRA) 25 MG tablet Take 1-2 tablets by mouth Daily As Needed for Erectile Dysfunction. Take 30 min prior to sexual intercourse 30 tablet 1     No current facility-administered medications for this visit.     Past Surgical History:   Procedure Laterality Date    APPENDECTOMY  2019    KNEE MENISCAL REPAIR Right       There are no preventive care reminders to display for this patient.     Immunization History   Administered Date(s) Administered    COVID-19 (PFIZER) Purple Cap Monovalent 04/02/2021, 04/22/2021, 11/14/2021    Flu Vaccine Split Quad 10/21/2020    Fluzone (or Fluarix & Flulaval for VFC) >6mos 10/26/2021, 11/01/2022, 11/06/2023     Influenza, Unspecified 10/21/2020, 10/26/2021         Part of this note may be an electronic transcription/translation of spoken language to printed   text using the Dragon Dictation System.      Jennifer Russ, APRN

## 2024-11-08 ENCOUNTER — LAB (OUTPATIENT)
Dept: LAB | Facility: HOSPITAL | Age: 49
End: 2024-11-08
Payer: COMMERCIAL

## 2024-11-08 ENCOUNTER — OFFICE VISIT (OUTPATIENT)
Dept: FAMILY MEDICINE CLINIC | Age: 49
End: 2024-11-08
Payer: COMMERCIAL

## 2024-11-08 VITALS
DIASTOLIC BLOOD PRESSURE: 87 MMHG | BODY MASS INDEX: 33.37 KG/M2 | HEART RATE: 83 BPM | HEIGHT: 74 IN | OXYGEN SATURATION: 98 % | TEMPERATURE: 98 F | SYSTOLIC BLOOD PRESSURE: 147 MMHG | WEIGHT: 260 LBS

## 2024-11-08 DIAGNOSIS — Z00.00 ROUTINE GENERAL MEDICAL EXAMINATION AT A HEALTH CARE FACILITY: ICD-10-CM

## 2024-11-08 DIAGNOSIS — R03.0 ELEVATED BLOOD PRESSURE READING: ICD-10-CM

## 2024-11-08 DIAGNOSIS — Z00.00 ROUTINE GENERAL MEDICAL EXAMINATION AT A HEALTH CARE FACILITY: Primary | ICD-10-CM

## 2024-11-08 DIAGNOSIS — E66.09 CLASS 1 OBESITY DUE TO EXCESS CALORIES WITHOUT SERIOUS COMORBIDITY WITH BODY MASS INDEX (BMI) OF 33.0 TO 33.9 IN ADULT: ICD-10-CM

## 2024-11-08 DIAGNOSIS — Z13.6 SCREENING FOR CARDIOVASCULAR CONDITION: ICD-10-CM

## 2024-11-08 DIAGNOSIS — D64.9 ANEMIA, UNSPECIFIED TYPE: ICD-10-CM

## 2024-11-08 DIAGNOSIS — R42 INTERMITTENT LIGHTHEADEDNESS: ICD-10-CM

## 2024-11-08 DIAGNOSIS — Z23 IMMUNIZATION DUE: ICD-10-CM

## 2024-11-08 DIAGNOSIS — R73.03 PREDIABETES: ICD-10-CM

## 2024-11-08 DIAGNOSIS — E66.811 CLASS 1 OBESITY DUE TO EXCESS CALORIES WITHOUT SERIOUS COMORBIDITY WITH BODY MASS INDEX (BMI) OF 33.0 TO 33.9 IN ADULT: ICD-10-CM

## 2024-11-08 LAB
ALBUMIN SERPL-MCNC: 4.3 G/DL (ref 3.5–5.2)
ALBUMIN/GLOB SERPL: 1.5 G/DL
ALP SERPL-CCNC: 57 U/L (ref 39–117)
ALT SERPL W P-5'-P-CCNC: 21 U/L (ref 1–41)
ANION GAP SERPL CALCULATED.3IONS-SCNC: 10.5 MMOL/L (ref 5–15)
AST SERPL-CCNC: 27 U/L (ref 1–40)
BASOPHILS # BLD AUTO: 0.06 10*3/MM3 (ref 0–0.2)
BASOPHILS NFR BLD AUTO: 0.7 % (ref 0–1.5)
BILIRUB SERPL-MCNC: 0.6 MG/DL (ref 0–1.2)
BUN SERPL-MCNC: 11 MG/DL (ref 6–20)
BUN/CREAT SERPL: 8.9 (ref 7–25)
CALCIUM SPEC-SCNC: 9.1 MG/DL (ref 8.6–10.5)
CHLORIDE SERPL-SCNC: 105 MMOL/L (ref 98–107)
CHOLEST SERPL-MCNC: 136 MG/DL (ref 0–200)
CO2 SERPL-SCNC: 25.5 MMOL/L (ref 22–29)
CREAT SERPL-MCNC: 1.24 MG/DL (ref 0.76–1.27)
DEPRECATED RDW RBC AUTO: 49.1 FL (ref 37–54)
EGFRCR SERPLBLD CKD-EPI 2021: 71.3 ML/MIN/1.73
EOSINOPHIL # BLD AUTO: 0.09 10*3/MM3 (ref 0–0.4)
EOSINOPHIL NFR BLD AUTO: 1 % (ref 0.3–6.2)
ERYTHROCYTE [DISTWIDTH] IN BLOOD BY AUTOMATED COUNT: 16.4 % (ref 12.3–15.4)
GLOBULIN UR ELPH-MCNC: 2.8 GM/DL
GLUCOSE SERPL-MCNC: 88 MG/DL (ref 65–99)
HCT VFR BLD AUTO: 40.8 % (ref 37.5–51)
HDLC SERPL-MCNC: 38 MG/DL (ref 40–60)
HGB BLD-MCNC: 12.8 G/DL (ref 13–17.7)
IMM GRANULOCYTES # BLD AUTO: 0.01 10*3/MM3 (ref 0–0.05)
IMM GRANULOCYTES NFR BLD AUTO: 0.1 % (ref 0–0.5)
IRON 24H UR-MRATE: 48 MCG/DL (ref 59–158)
IRON SATN MFR SERPL: 10 % (ref 20–50)
LDLC SERPL CALC-MCNC: 85 MG/DL (ref 0–100)
LDLC/HDLC SERPL: 2.25 {RATIO}
LYMPHOCYTES # BLD AUTO: 2.93 10*3/MM3 (ref 0.7–3.1)
LYMPHOCYTES NFR BLD AUTO: 32.2 % (ref 19.6–45.3)
MCH RBC QN AUTO: 25.7 PG (ref 26.6–33)
MCHC RBC AUTO-ENTMCNC: 31.4 G/DL (ref 31.5–35.7)
MCV RBC AUTO: 81.9 FL (ref 79–97)
MONOCYTES # BLD AUTO: 0.69 10*3/MM3 (ref 0.1–0.9)
MONOCYTES NFR BLD AUTO: 7.6 % (ref 5–12)
NEUTROPHILS NFR BLD AUTO: 5.32 10*3/MM3 (ref 1.7–7)
NEUTROPHILS NFR BLD AUTO: 58.4 % (ref 42.7–76)
PLATELET # BLD AUTO: 296 10*3/MM3 (ref 140–450)
PMV BLD AUTO: 9.5 FL (ref 6–12)
POTASSIUM SERPL-SCNC: 3.9 MMOL/L (ref 3.5–5.2)
PROT SERPL-MCNC: 7.1 G/DL (ref 6–8.5)
RBC # BLD AUTO: 4.98 10*6/MM3 (ref 4.14–5.8)
SODIUM SERPL-SCNC: 141 MMOL/L (ref 136–145)
TIBC SERPL-MCNC: 495 MCG/DL (ref 298–536)
TRANSFERRIN SERPL-MCNC: 332 MG/DL (ref 200–360)
TRIGL SERPL-MCNC: 63 MG/DL (ref 0–150)
TSH SERPL DL<=0.05 MIU/L-ACNC: 0.9 UIU/ML (ref 0.27–4.2)
VLDLC SERPL-MCNC: 13 MG/DL (ref 5–40)
WBC NRBC COR # BLD AUTO: 9.1 10*3/MM3 (ref 3.4–10.8)

## 2024-11-08 PROCEDURE — 99396 PREV VISIT EST AGE 40-64: CPT

## 2024-11-08 PROCEDURE — 36415 COLL VENOUS BLD VENIPUNCTURE: CPT

## 2024-11-08 PROCEDURE — 83036 HEMOGLOBIN GLYCOSYLATED A1C: CPT

## 2024-11-08 PROCEDURE — 80061 LIPID PANEL: CPT

## 2024-11-08 PROCEDURE — 85025 COMPLETE CBC W/AUTO DIFF WBC: CPT

## 2024-11-08 PROCEDURE — 80053 COMPREHEN METABOLIC PANEL: CPT

## 2024-11-08 PROCEDURE — 84443 ASSAY THYROID STIM HORMONE: CPT

## 2024-11-08 PROCEDURE — 84466 ASSAY OF TRANSFERRIN: CPT

## 2024-11-08 PROCEDURE — 83540 ASSAY OF IRON: CPT

## 2024-11-08 RX ORDER — MULTIPLE VITAMINS W/ MINERALS TAB 9MG-400MCG
1 TAB ORAL DAILY
COMMUNITY

## 2024-11-08 NOTE — PROGRESS NOTES
Chief Complaint  Annual Exam    Subjective          Efrain Stokes presents to [facility] today for his annual physical.  He is a patient of CIRILO Chauhan.    He is due for a COVID-vaccine and a Tdap.  Up-to-date on flu vaccine.  He is up-to-date on his colonoscopy, completed in 2022 and repeat recommended in 10 years.  He is a non-smoker.  Reports rare alcohol use.  Up-to-date on his eye exam but due for dental exam.  He is due for routine labs.    He has been on Ozempic through an online company.  States that he has lost 20 pounds.    He reports over the last 6 to 9 months he has had worsening episodes of lightheadedness.  This occurs approximately once every couple weeks and only while he is working out.  He states it typically occurs when he changes positions such as doing [employer].  He has never passed out.  Denies any chest pain, shortness of breath, wheezing.  No other symptoms at time of of lightheadedness.  Typically he can feel the symptoms coming on and he will rest and they only last about 10 seconds.  Describes it as feeling his head floating.  Has headaches occasionally, approximately once a month when he does not sleep well.  Drinks 60 to 100 ounces of water per day.  States his diet is pretty good, states about 1800 to 2000 nila/day.  He does note that he does not eat prior to working out.    Review of Systems   Constitutional:  Negative for chills and fever.   Respiratory:  Negative for shortness of breath and wheezing.    Cardiovascular:  Negative for chest pain, palpitations and leg swelling.   Gastrointestinal:  Negative for abdominal pain, blood in stool, constipation, diarrhea, nausea and vomiting.   Neurological:  Positive for light-headedness and headaches (once a month, usually after bad nights sleep). Negative for syncope, weakness and numbness.         Current Outpatient Medications:     multivitamin with minerals tablet tablet, Take 1 tablet by  "mouth Daily., Disp: , Rfl:     SEMAGLUTIDE, 2 MG/DOSE, SC, Inject  under the skin into the appropriate area as directed., Disp: , Rfl:     Allergies:  Patient has no known allergies.      Objective   Vital Signs:   Vitals:    11/08/24 1018   BP: 147/87   BP Location: Left arm   Patient Position: Sitting   Cuff Size: Large Adult   Pulse: 83   Temp: 98 °F (36.7 °C)   TempSrc: Oral   SpO2: 98%   Weight: 118 kg (260 lb)   Height: 188 cm (74.02\")     Vitals:    11/08/24 1018 11/08/24 1049 11/08/24 1051 11/08/24 1053   Orthostatic BP:  129/88 126/88 120/87   Orthostatic Pulse:  82 96 97   Patient Position: Sitting Lying Sitting Standing       Body mass index is 33.37 kg/m².      Physical Exam  Vitals and nursing note reviewed.   Constitutional:       General: He is not in acute distress.     Appearance: Normal appearance. He is not ill-appearing.   HENT:      Head: Normocephalic.      Right Ear: Tympanic membrane, ear canal and external ear normal.      Left Ear: Tympanic membrane, ear canal and external ear normal.      Mouth/Throat:      Lips: Pink.   Eyes:      Extraocular Movements: Extraocular movements intact.      Pupils: Pupils are equal, round, and reactive to light.   Cardiovascular:      Rate and Rhythm: Normal rate and regular rhythm.      Heart sounds: Normal heart sounds. No murmur heard.  Pulmonary:      Effort: Pulmonary effort is normal. No accessory muscle usage or respiratory distress.      Breath sounds: Normal breath sounds. No wheezing or rhonchi.   Abdominal:      General: Bowel sounds are normal. There is no distension.      Palpations: Abdomen is soft.      Tenderness: There is no abdominal tenderness. There is no guarding or rebound.   Musculoskeletal:      Cervical back: Normal range of motion.      Right lower leg: No edema.      Left lower leg: No edema.   Skin:     General: Skin is warm and dry.   Neurological:      General: No focal deficit present.      Mental Status: He is alert and " oriented to person, place, and time.      Cranial Nerves: No facial asymmetry.      Gait: Gait is intact.   Psychiatric:         Mood and Affect: Mood and affect normal.         Behavior: Behavior normal.               Assessment and Plan    Assessment & Plan  Routine general medical examination at a health care facility  Annual physical exam completed today.  Recommended health maintenance topics were discussed and reviewed with patient.  Recommend healthy diet, routine exercise.  Recommend routine dental and eye exams.    Orders:    CBC w AUTO Differential; Future    Immunization due  Declines vaccinations       Screening for cardiovascular condition    Orders:    Lipid panel; Future    Comprehensive metabolic panel; Future    Prediabetes  A1c from 11-6-2023 was 6.1.  Rechecking A1c today.    Orders:    Hemoglobin A1c; Future    Elevated blood pressure reading  Blood pressure elevated on exam, improved during orthostatic vitals.       Intermittent lightheadedness  Exam is not concerning today.  Checking some labs as noted.  Did offer further workup including EKG, Holter monitor, etc.  Patient declines further workup at this time, will await lab results for further plan. Recommend he try eating prior to working out to see if this has any effect on his symptoms.    Orders:    Comprehensive metabolic panel; Future    Hemoglobin A1c; Future    TSH Rfx On Abnormal To Free T4; Future    CBC w AUTO Differential; Future    Anemia, unspecified type  Added on iron panel after CBC resulted with anemia.    Orders:    Iron Profile; Future    Class 1 obesity due to excess calories without serious comorbidity with body mass index (BMI) of 33.0 to 33.9 in adult  Patient's (Body mass index is 33.37 kg/m².) indicates that they are obese (BMI >30) with health conditions that include none . Weight is improving with treatment. BMI  is above average; BMI management plan is completed. We discussed portion control and increasing  exercise.  I did advise patient that I do not recommend the use of Ozempic without supervision from a provider.  He voiced understanding.             Follow Up   Return in about 1 year (around 11/8/2025) for Annual physical or sooner pending lab results.  Patient was given instructions and counseling regarding his condition or for health maintenance advice. Please see specific information pulled into the AVS if appropriate.     11/08/2024

## 2024-11-09 LAB — HBA1C MFR BLD: 5.8 % (ref 4.8–5.6)

## 2024-11-19 ENCOUNTER — OFFICE VISIT (OUTPATIENT)
Dept: FAMILY MEDICINE CLINIC | Age: 49
End: 2024-11-19
Payer: COMMERCIAL

## 2024-11-19 VITALS
BODY MASS INDEX: 32.96 KG/M2 | HEART RATE: 100 BPM | OXYGEN SATURATION: 96 % | WEIGHT: 256.8 LBS | HEIGHT: 74 IN | DIASTOLIC BLOOD PRESSURE: 90 MMHG | SYSTOLIC BLOOD PRESSURE: 132 MMHG | TEMPERATURE: 98.3 F

## 2024-11-19 DIAGNOSIS — D50.9 IRON DEFICIENCY ANEMIA, UNSPECIFIED IRON DEFICIENCY ANEMIA TYPE: Primary | ICD-10-CM

## 2024-11-19 PROCEDURE — 99214 OFFICE O/P EST MOD 30 MIN: CPT | Performed by: NURSE PRACTITIONER

## 2024-11-19 NOTE — PROGRESS NOTES
"Chief Complaint  Efrain Stokes presents to [facility] for Follow-up (Discuss recent labs. )    Subjective     History of Present Illness  Efrain is in today to follow up on recent results from routine labs.  He was in for wellness and found to be anemic.  Although not extreme but his CBC was down 4 years ago  Hgb 15.2  and 11/8/2024  hgb 12.8.  He denies any bleeding or blood in stool.    Denies overuse of NSAIDs   He has recently started on semaglutide compounded  for weight loss through an outside [facility]  Colonoscopy 12/2022 showing hyperplastic polyp and according to Dr. Mtz note, recall in 10 years advised   Assessment and Plan     Diagnoses and all orders for this visit:    1. Iron deficiency anemia, unspecified iron deficiency anemia type (Primary)  Comments:  advised that we check stool for acute blood to determine if source, repeat levels again in 6 weeks  unless symptoms develop.  Orders:  -     POCT Occult blood x 3, stool; Future  -     Cancel: CBC No Differential; Future  -     Iron Profile; Future  -     CBC & Differential; Future            Follow Up   Return in about 6 weeks (around 12/31/2024).  No future appointments.    No orders of the defined types were placed in this encounter.      There are no discontinued medications.       Review of Systems    Objective     Vitals:    11/19/24 1142 11/19/24 1159   BP: 143/90 132/90   BP Location: Right arm    Patient Position: Sitting    Cuff Size: Large Adult    Pulse: 91 100   Temp: 98.3 °F (36.8 °C)    TempSrc: Oral    SpO2: 96%    Weight: 116 kg (256 lb 12.8 oz)    Height: 188 cm (74.02\")             Physical Exam  Vitals reviewed.   Constitutional:       Appearance: Normal appearance.   HENT:      Head: Normocephalic.   Eyes:      Pupils: Pupils are equal, round, and reactive to light.   Cardiovascular:      Rate and Rhythm: Normal rate and regular rhythm.      Heart sounds: No murmur heard.  Pulmonary:      " Effort: Pulmonary effort is normal.      Breath sounds: Normal breath sounds.   Abdominal:      Tenderness: There is no abdominal tenderness.   Musculoskeletal:         General: Normal range of motion.   Neurological:      Mental Status: He is alert.   Psychiatric:         Mood and Affect: Mood normal.         Behavior: Behavior normal.               Result Review   The following data was reviewed by: CIRILO Chauhan on 11/19/2024:  Lipid panel (11/08/2024 11:03)  Comprehensive metabolic panel (11/08/2024 11:03)  Hemoglobin A1c (11/08/2024 11:03)  TSH Rfx On Abnormal To Free T4 (11/08/2024 11:03)  CBC w AUTO Differential (11/08/2024 11:03)  Iron Profile (11/08/2024 11:03)         No Known Allergies   No past medical history on file.  Current Outpatient Medications   Medication Sig Dispense Refill    multivitamin with minerals tablet tablet Take 1 tablet by mouth Daily.      SEMAGLUTIDE, 2 MG/DOSE, SC Inject  under the skin into the appropriate area as directed.       No current facility-administered medications for this visit.     Past Surgical History:   Procedure Laterality Date    APPENDECTOMY  2019    KNEE MENISCAL REPAIR Right       Health Maintenance Due   Topic Date Due    TDAP/TD VACCINES (1 - Tdap) Never done      Immunization History   Administered Date(s) Administered    COVID-19 (PFIZER) Purple Cap Monovalent 04/02/2021, 04/22/2021, 11/14/2021    Flu Vaccine Split Quad 10/21/2020    Fluzone  >6mos 10/08/2024    Fluzone (or Fluarix & Flulaval for VFC) >6mos 10/26/2021, 11/01/2022, 11/06/2023    Influenza, Unspecified 10/21/2020, 10/26/2021         Part of this note may be an electronic transcription/translation of spoken language to printed   text using the Dragon Dictation System.      CIRILO Chauhan

## 2024-12-10 ENCOUNTER — CLINICAL SUPPORT (OUTPATIENT)
Dept: FAMILY MEDICINE CLINIC | Age: 49
End: 2024-12-10
Payer: COMMERCIAL

## 2024-12-10 DIAGNOSIS — D50.9 IRON DEFICIENCY ANEMIA, UNSPECIFIED IRON DEFICIENCY ANEMIA TYPE: ICD-10-CM

## 2024-12-10 LAB
EXPIRATION DATE 2: NORMAL
EXPIRATION DATE 3: NORMAL
EXPIRATION DATE: NORMAL
GASTROCULT GAST QL: NEGATIVE
HEMOCCULT SP2 STL QL: NEGATIVE
HEMOCCULT SP3 STL QL: NEGATIVE
Lab: NORMAL

## 2024-12-10 PROCEDURE — 82274 ASSAY TEST FOR BLOOD FECAL: CPT | Performed by: NURSE PRACTITIONER

## 2025-01-24 ENCOUNTER — LAB (OUTPATIENT)
Dept: LAB | Facility: HOSPITAL | Age: 50
End: 2025-01-24
Payer: COMMERCIAL

## 2025-01-24 DIAGNOSIS — D50.9 IRON DEFICIENCY ANEMIA, UNSPECIFIED IRON DEFICIENCY ANEMIA TYPE: ICD-10-CM

## 2025-01-24 LAB
BASOPHILS # BLD AUTO: 0.06 10*3/MM3 (ref 0–0.2)
BASOPHILS NFR BLD AUTO: 0.6 % (ref 0–1.5)
DEPRECATED RDW RBC AUTO: 49.6 FL (ref 37–54)
EOSINOPHIL # BLD AUTO: 0.07 10*3/MM3 (ref 0–0.4)
EOSINOPHIL NFR BLD AUTO: 0.7 % (ref 0.3–6.2)
ERYTHROCYTE [DISTWIDTH] IN BLOOD BY AUTOMATED COUNT: 15.9 % (ref 12.3–15.4)
HCT VFR BLD AUTO: 41.6 % (ref 37.5–51)
HGB BLD-MCNC: 13 G/DL (ref 13–17.7)
IMM GRANULOCYTES # BLD AUTO: 0.01 10*3/MM3 (ref 0–0.05)
IMM GRANULOCYTES NFR BLD AUTO: 0.1 % (ref 0–0.5)
IRON 24H UR-MRATE: 49 MCG/DL (ref 59–158)
IRON SATN MFR SERPL: 9 % (ref 20–50)
LYMPHOCYTES # BLD AUTO: 1.99 10*3/MM3 (ref 0.7–3.1)
LYMPHOCYTES NFR BLD AUTO: 20.5 % (ref 19.6–45.3)
MCH RBC QN AUTO: 26 PG (ref 26.6–33)
MCHC RBC AUTO-ENTMCNC: 31.3 G/DL (ref 31.5–35.7)
MCV RBC AUTO: 83.2 FL (ref 79–97)
MONOCYTES # BLD AUTO: 0.71 10*3/MM3 (ref 0.1–0.9)
MONOCYTES NFR BLD AUTO: 7.3 % (ref 5–12)
NEUTROPHILS NFR BLD AUTO: 6.89 10*3/MM3 (ref 1.7–7)
NEUTROPHILS NFR BLD AUTO: 70.8 % (ref 42.7–76)
PLATELET # BLD AUTO: 317 10*3/MM3 (ref 140–450)
PMV BLD AUTO: 10.3 FL (ref 6–12)
RBC # BLD AUTO: 5 10*6/MM3 (ref 4.14–5.8)
TIBC SERPL-MCNC: 527 MCG/DL (ref 298–536)
TRANSFERRIN SERPL-MCNC: 354 MG/DL (ref 200–360)
WBC NRBC COR # BLD AUTO: 9.73 10*3/MM3 (ref 3.4–10.8)

## 2025-01-24 PROCEDURE — 83540 ASSAY OF IRON: CPT

## 2025-01-24 PROCEDURE — 85025 COMPLETE CBC W/AUTO DIFF WBC: CPT

## 2025-01-24 PROCEDURE — 84466 ASSAY OF TRANSFERRIN: CPT

## 2025-01-24 PROCEDURE — 36415 COLL VENOUS BLD VENIPUNCTURE: CPT
